# Patient Record
Sex: FEMALE | Race: WHITE
[De-identification: names, ages, dates, MRNs, and addresses within clinical notes are randomized per-mention and may not be internally consistent; named-entity substitution may affect disease eponyms.]

---

## 2017-09-26 NOTE — OR
DATE OF OPERATION:  09/26/2017

 

PREOPERATIVE DIAGNOSIS:  Colon cancer screening.

 

POSTOPERATIVE DIAGNOSIS:  Colon cancer screening.

 

PROCEDURE PERFORMED:  Colonoscopy.

 

ANESTHESIA:  MAC.

 

ESTIMATED BLOOD LOSS:  None.

 

COMPLICATIONS:  None.

 

INDICATIONS FOR THE PROCEDURE:  The patient is a 62-year-old female, who is here for routine

screening colonoscopy.  Her last colonoscopy was approximately 11 years ago, and was normal

per patient.  The patient has had some lower abdominal pains recently; however, these have

now resolved.  The patient, otherwise, has no change in bowel habits.

 

DESCRIPTION OF PROCEDURE:  Informed consent was obtained from the patient.  The patient was

taken to the operating room and placed on the table in the left lateral decubitus position.

Monitored anaesthesia care was applied.  Digital rectal examination was performed and no

masses were identified.  The patient had good rectal tone.  The colonoscope was then

advanced through the anus, and directed towards the cecum, and did reach the cecum

identified by the appendiceal orifice and ileocecal valve.  Colonoscope was slowly

withdrawn.  No masses.  No polyps.  No AV malformations.  No areas of ischemia or

inflammation were identified.  Retroflexion was performed in the rectum, which was

unremarkable.  Colonoscope was then removed.

 

FINDINGS:  Normal colonoscopy.

 

RECOMMENDATIONS:  Would recommend repeat screening colonoscopy in 10 years.

 

 

MN/LINDA

DD:  09/26/2017 14:06:19

DT:  09/26/2017 17:52:15

Job #:  [id number]

## 2021-02-13 ENCOUNTER — HOSPITAL ENCOUNTER (EMERGENCY)
Dept: HOSPITAL 60 - LB.ED | Age: 66
Discharge: HOME | End: 2021-02-13
Payer: MEDICARE

## 2021-02-13 VITALS — DIASTOLIC BLOOD PRESSURE: 89 MMHG | SYSTOLIC BLOOD PRESSURE: 139 MMHG | HEART RATE: 103 BPM

## 2021-02-13 DIAGNOSIS — S80.01XA: Primary | ICD-10-CM
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
## 2021-02-13 DIAGNOSIS — S80.11XA: ICD-10-CM
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
## 2021-02-14 NOTE — CR
DATE OF SERVICE:  02/13/2021

CLINICAL DATA: Right leg pain



RIGHT KNEE:  



There are tricompartment osteoarthritic changes of the right knee joint.  There 
is a small joint effusion.  



No acute fracture or dislocation.  No focal lytic or blastic bone lesions.  





LEFT KNEE:  



There are tricompartment osteoarthritic changes of the knee joint.  There is a 
small joint effusion.  



The patient is status post internal fixation of the proximal tibia.  



No acute fracture or dislocation.  No focal lytic or blastic bone lesions.  





[id number]

[facility]

## 2021-02-14 NOTE — CR
DATE OF SERVICE:  02/13/2021

CLINICAL DATA:  Right leg pain



RIGHT LOWER LEG:  



No acute abnormalities.  No lytic or blastic bone lesions.  



[id number]

[facility]

## 2022-02-05 ENCOUNTER — HOSPITAL ENCOUNTER (EMERGENCY)
Dept: HOSPITAL 60 - LB.ED | Age: 67
Discharge: HOME | End: 2022-02-05
Payer: MEDICARE

## 2022-02-05 VITALS — SYSTOLIC BLOOD PRESSURE: 140 MMHG | HEART RATE: 86 BPM | DIASTOLIC BLOOD PRESSURE: 84 MMHG

## 2022-02-05 DIAGNOSIS — T81.89XA: Primary | ICD-10-CM

## 2024-09-02 ENCOUNTER — HOSPITAL ENCOUNTER (EMERGENCY)
Dept: HOSPITAL 60 - LB.ED | Age: 69
Discharge: HOME | End: 2024-09-02
Payer: MEDICARE

## 2024-09-02 VITALS — SYSTOLIC BLOOD PRESSURE: 146 MMHG | HEART RATE: 57 BPM | DIASTOLIC BLOOD PRESSURE: 83 MMHG

## 2024-09-02 DIAGNOSIS — I10: ICD-10-CM

## 2024-09-02 DIAGNOSIS — M25.561: Primary | ICD-10-CM
